# Patient Record
Sex: FEMALE
[De-identification: names, ages, dates, MRNs, and addresses within clinical notes are randomized per-mention and may not be internally consistent; named-entity substitution may affect disease eponyms.]

---

## 2020-01-01 ENCOUNTER — APPOINTMENT (OUTPATIENT)
Dept: PULMONOLOGY | Facility: CLINIC | Age: 85
End: 2020-01-01
Payer: MEDICARE

## 2020-01-01 VITALS
SYSTOLIC BLOOD PRESSURE: 150 MMHG | BODY MASS INDEX: 35.79 KG/M2 | WEIGHT: 202 LBS | HEIGHT: 63 IN | HEART RATE: 78 BPM | OXYGEN SATURATION: 91 % | TEMPERATURE: 98.1 F | DIASTOLIC BLOOD PRESSURE: 74 MMHG | RESPIRATION RATE: 16 BRPM

## 2020-01-01 DIAGNOSIS — I51.7 CARDIOMEGALY: ICD-10-CM

## 2020-01-01 DIAGNOSIS — R09.02 HYPOXEMIA: ICD-10-CM

## 2020-01-01 LAB — SARS-COV-2 N GENE NPH QL NAA+PROBE: NOT DETECTED

## 2020-01-01 PROCEDURE — 99203 OFFICE O/P NEW LOW 30 MIN: CPT

## 2020-01-01 PROCEDURE — 99072 ADDL SUPL MATRL&STAF TM PHE: CPT

## 2020-10-30 PROBLEM — Z00.00 ENCOUNTER FOR PREVENTIVE HEALTH EXAMINATION: Status: ACTIVE | Noted: 2020-01-01

## 2020-12-04 PROBLEM — R09.02 HYPOXIA: Status: ACTIVE | Noted: 2020-01-01

## 2020-12-04 PROBLEM — I51.7 CARDIOMEGALY: Status: ACTIVE | Noted: 2020-01-01

## 2020-12-06 NOTE — HISTORY OF PRESENT ILLNESS
[TextBox_4] :  85 year old woman presents for evaluation of possible COPD.\par She presented to clinic for COVID NAAT, in order to see her .  She was noted to be hypoxic and was referred to [facility] for admission.  She was evaluated with CT angio that was negative for PE.  Mild apical emphysema present.  there was evidence for pulmonary HTN\par \par She was discharged on Oxygen and ICS/LABA\par \par She has fatigue, she has no dyspnea, cough, no chest pain\par \par She has LE edema for a year.\par \par Her primary doctor is Soren eVlarde\par \par \par PSH:\par right nephrectomy for cancer 4 years ago, no recurrence\par \par left mastectomy\par \par bladder tumor, removed \par \par Bilateral TKR\par \par vein stripping\par \par PMH:\par \par PE after child\par \par \par \par \par SH:\par \par \par former smoker\par \par 1.5 packs per day\par \par smoked for  30  years\par stopped smoking  20 years ago\par \par ETOH:  none\par \par worked in office\par \par \par Occupation: retired, worked as \par \par No exposure to chemicals, dust, asbestos, mold\par \par lives in private house\par \par 1 dog exposed to\par \par \par \par ALLERGY:\par \par NKDA\par \par \par environmental/seasonal allergy:\par \par \par \par Review of Systems:\par \par No rash,\par No dry eyes\par no mouth ulcers\par no sinusitis, sinus infections, nasal obstruction\par no dysphagia\par no dry mouth\par \par she has arthritis\par  pneumonia several times, 1 year ago\par \par no lung cancer\par \par no CAD\par no MI\par no chest pain\par no murmur\par no CHF\par she has HTN\par She has edema of legs\par \par Has GERD\par \par \par no Diabetes\par no thyroid disease, thyroid nodules\par \par \par no bleeding\par \par no DVT or PE\par \par no kidney disease\par \par no stroke\par no seizure\par \par SLEEP\par No snoring, witnessed apnea, excessive daytime sleepiness, morning headache \par \par She has had the influenza vaccine this season. \par \par She has had had pneumonia vaccine \par \par \par \par \par \par \par \par \par \par \par \par

## 2020-12-06 NOTE — PHYSICAL EXAM
[Enlarged Base of the Tongue] : enlarged base of the tongue [III] : Mallampati Class: III [No Neck Mass] : no neck mass [No JVD] : no jvd [Normal Rate/Rhythm] : normal rate/rhythm [Normal S1, S2] : normal s1, s2 [No Murmurs] : no murmurs [No Resp Distress] : no resp distress [Benign] : benign [No Masses] : no masses [Normal Bowel Sounds] : normal bowel sounds [No Focal Deficits] : no focal deficits [Oriented x3] : oriented x3 [TextBox_2] : elevated BMI [TextBox_44] : kyphotic [TextBox_68] : diminished [TextBox_105] : 1-2 + pretibial edema

## 2020-12-06 NOTE — REVIEW OF SYSTEMS
[Fever] : no fever [Dry Eyes] : no dry eyes [Cough] : no cough [Chest Discomfort] : no chest discomfort [Palpitations] : no palpitations

## 2020-12-06 NOTE — DISCUSSION/SUMMARY
[FreeTextEntry1] : Dyspnea\par \par edema\par \par possible BEN\par \par emphysema\par \par pulm HTN\par \par PLAN\par \par PFT\par \par use NC\par \par echocardiogram\par \par \par cardio consult regarding vasc calcification\par \par continue  O2\par \par cont albuterol  MDI\par \par \par consider sleep study\par \par \par Reji Jones MD FCCP \par